# Patient Record
Sex: MALE | Race: WHITE | Employment: FULL TIME | URBAN - NONMETROPOLITAN AREA
[De-identification: names, ages, dates, MRNs, and addresses within clinical notes are randomized per-mention and may not be internally consistent; named-entity substitution may affect disease eponyms.]

---

## 2017-05-02 ENCOUNTER — OFFICE VISIT (OUTPATIENT)
Dept: PRIMARY CARE CLINIC | Age: 22
End: 2017-05-02
Payer: COMMERCIAL

## 2017-05-02 VITALS
HEART RATE: 79 BPM | TEMPERATURE: 97.4 F | OXYGEN SATURATION: 97 % | HEIGHT: 74 IN | RESPIRATION RATE: 16 BRPM | BODY MASS INDEX: 25.03 KG/M2 | WEIGHT: 195 LBS | DIASTOLIC BLOOD PRESSURE: 74 MMHG | SYSTOLIC BLOOD PRESSURE: 124 MMHG

## 2017-05-02 DIAGNOSIS — J02.9 SORE THROAT: Primary | ICD-10-CM

## 2017-05-02 LAB — S PYO AG THROAT QL: NORMAL

## 2017-05-02 PROCEDURE — 99213 OFFICE O/P EST LOW 20 MIN: CPT | Performed by: FAMILY MEDICINE

## 2017-05-02 PROCEDURE — 87880 STREP A ASSAY W/OPTIC: CPT | Performed by: FAMILY MEDICINE

## 2020-09-02 ENCOUNTER — HOSPITAL ENCOUNTER (EMERGENCY)
Age: 25
Discharge: HOME | End: 2020-09-02
Payer: COMMERCIAL

## 2020-09-02 VITALS
WEIGHT: 230 LBS | DIASTOLIC BLOOD PRESSURE: 78 MMHG | HEART RATE: 63 BPM | HEIGHT: 74 IN | SYSTOLIC BLOOD PRESSURE: 136 MMHG | TEMPERATURE: 97.2 F | RESPIRATION RATE: 16 BRPM | OXYGEN SATURATION: 97 % | BODY MASS INDEX: 29.52 KG/M2

## 2020-09-02 PROCEDURE — 99202 OFFICE O/P NEW SF 15 MIN: CPT | Performed by: NURSE PRACTITIONER

## 2020-09-02 PROCEDURE — 99202 OFFICE O/P NEW SF 15 MIN: CPT

## 2020-09-02 RX ORDER — PREDNISONE 10 MG/1
TABLET ORAL
Qty: 40 TABLET | Refills: 0 | Status: SHIPPED | OUTPATIENT
Start: 2020-09-02 | End: 2020-09-12

## 2020-09-02 RX ORDER — PERMETHRIN 50 MG/G
CREAM TOPICAL
Qty: 1 TUBE | Refills: 1 | Status: SHIPPED | OUTPATIENT
Start: 2020-09-02

## 2020-09-02 ASSESSMENT — ENCOUNTER SYMPTOMS
TROUBLE SWALLOWING: 0
COLOR CHANGE: 1
SHORTNESS OF BREATH: 0
EYE PAIN: 0
EYE DISCHARGE: 0
EYE ITCHING: 0
EYE REDNESS: 0

## 2020-09-02 NOTE — ED TRIAGE NOTES
Woke up with rash on lower legs mostly, itches, was in woods over the week end, no one else at home has rash

## 2020-09-02 NOTE — LETTER
[facility]  [address] [place] [address]  Phone: [phone]               September 2, 2020    Patient: Broderick Nova   YOB: 1995   Date of Visit: 9/2/2020       To Whom It May Concern:    Donavan Oakes was seen and treated in our emergency department on 9/2/2020. He may return to work on 9/5/2020.       Sincerely,       JACQUELINE Tate CNP         Signature:_Electronically signed by JACQUELINE Tate CNP on 9/2/2020 at 5:17 PM  _________________________________

## 2020-09-02 NOTE — ED PROVIDER NOTES
[facility]  Ur[facility] Encounter       CHIEF COMPLAINT       Chief Complaint   Patient presents with    Rash       Nurses Notes reviewed and I agree except as noted in the HPI. HISTORY OF PRESENT ILLNESS   Niecy Verdin is a 22 y.o. male who presents to the urgent care center complaining of a rash to the lower ankles bilaterally lower legs going upward to the thigh patient stated he also has a scattered rash to the torso. The patient complains of itching. Patient states that he was in the woods with his family over the weekend when he developed a rash on Sunday. Patient stated at that time he was wearing boots and long pants And no one else had the rash but him. The patient complains of itching at night. Patient states he works at [employer]  and is around a lot of heat at work and that he was intensifying the itch on his legs so he went to the [employer] doctor. The company doctor stated that he should be off work for 3 days and then when he comes back next week he will be on a long stretch. Patient states that he has been scratching that a lot at night but has not really been putting any creams on the rash or taken anything for itch. He denies any other family members or children that have a rash. The history is provided by the patient. No  was used.    Rash   Location:  Leg  Leg rash location:  R lower leg and L lower leg  Quality: itchiness and redness    Quality: not blistering and not weeping    Severity:  Moderate  Onset quality:  Sudden  Duration:  5 days  Timing:  Constant  Progression:  Spreading  Chronicity:  New  Context: exposure to similar rash and plant contact    Relieved by:  Nothing  Worsened by:  Continued exposure to allergens  Ineffective treatments:  None tried  Associated symptoms: no fever, no headaches and no shortness of breath    Associated symptoms comment:  None      REVIEW OF SYSTEMS     Review of Systems   Constitutional: Negative for chills and fever. HENT: Negative for trouble swallowing. Eyes: Negative for pain, discharge, redness and itching. Respiratory: Negative for shortness of breath. Skin: Positive for color change and rash. Allergic/Immunologic: Negative for environmental allergies. Neurological: Negative for headaches. PAST MEDICAL HISTORY   History reviewed. No pertinent past medical history. SURGICALHISTORY     Patient  has a past surgical history that includes hernia repair (N/A). CURRENT MEDICATIONS       Discharge Medication List as of 9/2/2020  5:17 PM          ALLERGIES     Patient is has No Known Allergies. Patients   There is no immunization history on file for this patient. FAMILY HISTORY     Patient's family history is not on file. SOCIAL HISTORY     Patient  reports that he has never smoked. He has never used smokeless tobacco. He reports current alcohol use. He reports that he does not use drugs. PHYSICAL EXAM     ED TRIAGE VITALS  BP: 136/78, Temp: 97.2 °F (36.2 °C), Pulse: 63, Resp: 16, SpO2: 97 %,Estimated body mass index is 29.53 kg/m² as calculated from the following:    Height as of this encounter: 6' 2\" (1.88 m). Weight as of this encounter: 230 lb (104.3 kg). ,No LMP for male patient. Physical Exam  Vitals signs and nursing note reviewed. Constitutional:       General: He is not in acute distress. Appearance: He is well-developed. He is not ill-appearing, toxic-appearing or diaphoretic. HENT:      Head: Normocephalic. Nose: Nose normal.   Neck:      Musculoskeletal: Normal range of motion. Cardiovascular:      Rate and Rhythm: Normal rate. Pulmonary:      Effort: Pulmonary effort is normal.   Skin:     General: Skin is warm and dry. Capillary Refill: Capillary refill takes less than 2 seconds. Coloration: Skin is not pale. Findings: Erythema and rash present. No abrasion, ecchymosis, laceration or petechiae. Rash is papular. Rash is not purpuric, pustular, urticarial or vesicular. Comments: Lower legs bilaterally patient has scattered papular rash noted bilateral ankles radiating up to the lower legs and thighs. Patient also has scattered papular rash noted across the lower torso. Patient does complain of itching   Neurological:      Mental Status: He is alert and oriented to person, place, and time. Psychiatric:         Mood and Affect: Mood normal.         Behavior: Behavior normal. Behavior is cooperative. DIAGNOSTIC RESULTS     Labs:No results found for this visit on 09/02/20. IMAGING:    No orders to display         EKG:      URGENT CARE COURSE:     Vitals:    09/02/20 1647   BP: 136/78   Pulse: 63   Resp: 16   Temp: 97.2 °F (36.2 °C)   TempSrc: Infrared   SpO2: 97%   Weight: 230 lb (104.3 kg)   Height: 6' 2\" (1.88 m)       Medications - No data to display         PROCEDURES:  None    FINAL IMPRESSION      1. Chigger bites          DISPOSITION/ PLAN     I did discuss with the patient that it did look like more of a chiggers type of bite. The patient will be given a work slip for 3 days as requested by the company doctor. He was advised to do the following  Take Medication as Directed  Benadryl for itch, Don't scratch  Monitor for any increase in size or spreading  Monitor for fever or chills  Keep drainage covered if any.   Follow up with your PCP or return as needed  Or go to the emergency Department      PATIENT REFERRED TO:  Jeremyалександр Mumtaz  [address] [place] [address], [address] / [place] [id number]      DISCHARGE MEDICATIONS:  Discharge Medication List as of 9/2/2020  5:17 PM      START taking these medications    Details   permethrin (ELIMITE) 5 % cream Use as directed, Disp-1 Tube,R-1, Print      predniSONE (DELTASONE) 10 MG tablet 40 mg's po x 4 days, then 30 mg's po x 4 days, 20 mg's x 4 days, the 10 mgs po x 4 days, Disp-40 tablet,R-0Print             Discharge Medication List as of 9/2/2020  5:17 PM Discharge Medication List as of 9/2/2020  5:17 PM          JACQUELINE Kelly CNP    (Please note that portions of this note were completed with a voice recognition program. Efforts were made to edit the dictations but occasionally words are mis-transcribed.)           JACQUELINE Kelly CNP  09/02/20 48 JACQUELINE Bello CNP  09/02/20 1743
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
## 2020-09-02 NOTE — LETTER
[facility]  [address] [place] [id number]  Phone: [phone]               September 2, 2020    Patient: Jeremías Felton   YOB: 1995   Date of Visit: 9/2/2020       To Whom It May Concern:    Kenna Sparks was seen and treated in our emergency department on 9/2/2020. He may return to work on 9/6/2020.       Sincerely,       JACQUELINE Linton CNP         Signature:___Electronically signed by JACQUELINE Linton CNP on 9/2/20 at 5:23 PM EDT    _______________________________

## 2020-10-26 ENCOUNTER — HOSPITAL ENCOUNTER (EMERGENCY)
Age: 25
Discharge: HOME | End: 2020-10-26
Payer: COMMERCIAL

## 2020-10-26 VITALS
BODY MASS INDEX: 30.16 KG/M2 | HEART RATE: 63 BPM | RESPIRATION RATE: 18 BRPM | TEMPERATURE: 98.2 F | SYSTOLIC BLOOD PRESSURE: 135 MMHG | WEIGHT: 235 LBS | OXYGEN SATURATION: 98 % | DIASTOLIC BLOOD PRESSURE: 79 MMHG | HEIGHT: 74 IN

## 2020-10-26 PROCEDURE — U0003 INFECTIOUS AGENT DETECTION BY NUCLEIC ACID (DNA OR RNA); SEVERE ACUTE RESPIRATORY SYNDROME CORONAVIRUS 2 (SARS-COV-2) (CORONAVIRUS DISEASE [COVID-19]), AMPLIFIED PROBE TECHNIQUE, MAKING USE OF HIGH THROUGHPUT TECHNOLOGIES AS DESCRIBED BY CMS-2020-01-R: HCPCS

## 2020-10-26 PROCEDURE — 99213 OFFICE O/P EST LOW 20 MIN: CPT

## 2020-10-26 PROCEDURE — 99213 OFFICE O/P EST LOW 20 MIN: CPT | Performed by: NURSE PRACTITIONER

## 2020-10-26 ASSESSMENT — ENCOUNTER SYMPTOMS
SORE THROAT: 0
COUGH: 0
ABDOMINAL PAIN: 0
VOMITING: 0
SINUS PRESSURE: 1
SINUS PAIN: 1
NAUSEA: 0
SHORTNESS OF BREATH: 0
CHEST TIGHTNESS: 0
DIARRHEA: 0
WHEEZING: 0

## 2020-10-26 ASSESSMENT — PAIN DESCRIPTION - PAIN TYPE: TYPE: ACUTE PAIN

## 2020-10-26 ASSESSMENT — PAIN DESCRIPTION - DESCRIPTORS: DESCRIPTORS: ACHING

## 2020-10-26 ASSESSMENT — PAIN SCALES - GENERAL: PAINLEVEL_OUTOF10: 6

## 2020-10-26 ASSESSMENT — PAIN DESCRIPTION - LOCATION: LOCATION: GENERALIZED

## 2020-10-26 NOTE — ED NOTES
Patient verbalized understanding of discharge instructions. Denies questions or concerns at this time .      Sherryle Holter, RN  10/26/20 1906

## 2020-10-26 NOTE — ED PROVIDER NOTES
[place]  Urgent Care Encounter       CHIEF COMPLAINT       Chief Complaint   Patient presents with    Generalized Body Aches    Sinusitis       Nurses Notes reviewed and I agree except as noted in the HPI. HISTORY OF PRESENT ILLNESS   Linh Ji is a 22 y.o. male who presents     Patient is in the urgent care today with complaints of generalized body aches, sinus congestion, and loss of smell and taste, has started within the last 24 to 48 hours. He states that he does work in a [employer] and there have been other coworkers who have tested positive, however he does not believe he has been directly exposed to any of them. Denies any known fevers. Denies taking any over-the-counter medications. REVIEW OF SYSTEMS     Review of Systems   Constitutional: Negative for chills, fatigue and fever. HENT: Positive for congestion, postnasal drip, sinus pressure and sinus pain. Negative for sore throat. Respiratory: Negative for cough, chest tightness, shortness of breath and wheezing. Cardiovascular: Negative for chest pain and palpitations. Gastrointestinal: Negative for abdominal pain, diarrhea, nausea and vomiting. Musculoskeletal: Positive for myalgias. Negative for arthralgias. Skin: Negative for rash. Neurological: Negative for dizziness, weakness, light-headedness, numbness and headaches. PAST MEDICAL HISTORY   History reviewed. No pertinent past medical history. SURGICALHISTORY     Patient  has a past surgical history that includes hernia repair (N/A). CURRENT MEDICATIONS       Discharge Medication List as of 10/26/2020  5:06 PM      CONTINUE these medications which have NOT CHANGED    Details   permethrin (ELIMITE) 5 % cream Use as directed, Disp-1 Tube,R-1, Print             ALLERGIES     Patient is has No Known Allergies. Patients   There is no immunization history on file for this patient.     FAMILY HISTORY     Patient's family history is not on file. SOCIAL HISTORY     Patient  reports that he has never smoked. He has never used smokeless tobacco. He reports current alcohol use. He reports that he does not use drugs. PHYSICAL EXAM     ED TRIAGE VITALS  BP: 135/79, Temp: 98.2 °F (36.8 °C), Pulse: 63, Resp: 18, SpO2: 98 %,Estimated body mass index is 30.17 kg/m² as calculated from the following:    Height as of this encounter: 6' 2\" (1.88 m). Weight as of this encounter: 235 lb (106.6 kg). ,No LMP for male patient. Physical Exam  Constitutional:       General: He is not in acute distress. Appearance: Normal appearance. He is not ill-appearing, toxic-appearing or diaphoretic. HENT:      Nose: Congestion present. Mouth/Throat:      Mouth: Mucous membranes are moist.      Pharynx: No oropharyngeal exudate or posterior oropharyngeal erythema. Neck:      Musculoskeletal: Normal range of motion and neck supple. No neck rigidity or muscular tenderness. Pulmonary:      Effort: Pulmonary effort is normal. No respiratory distress. Musculoskeletal: Normal range of motion. Lymphadenopathy:      Cervical: No cervical adenopathy. Skin:     General: Skin is warm. Findings: No rash. Neurological:      General: No focal deficit present. Mental Status: He is alert and oriented to person, place, and time. Sensory: No sensory deficit. Psychiatric:         Mood and Affect: Mood normal.         Behavior: Behavior normal.         Thought Content: Thought content normal.         Judgment: Judgment normal.         DIAGNOSTIC RESULTS     Labs:No results found for this visit on 10/26/20.     IMAGING:    No orders to display     URGENT CARE COURSE:     Vitals:    10/26/20 1641   BP: 135/79   Pulse: 63   Resp: 18   Temp: 98.2 °F (36.8 °C)   TempSrc: Temporal   SpO2: 98%   Weight: 235 lb (106.6 kg)   Height: 6' 2\" (1.88 m)       Medications - No data to display         PROCEDURES:  None    FINAL IMPRESSION      1. COVID-19 ruled

## 2020-10-26 NOTE — ED TRIAGE NOTES
Patient states he has had sinus pressure with headache since Friday. Patient also states he has been achy with loss of taste and smell in the last 2 days.

## 2020-10-27 ENCOUNTER — CARE COORDINATION (OUTPATIENT)
Dept: CARE COORDINATION | Age: 25
End: 2020-10-27

## 2020-10-27 NOTE — CARE COORDINATION
Left voicemail message to return call to [phone] for ED/ Covid outreach. This is the 2nd attempt to contact, no further attempts will be made.

## 2020-10-28 LAB — SARS-COV-2: DETECTED

## 2023-03-14 ENCOUNTER — HOSPITAL ENCOUNTER (EMERGENCY)
Age: 28
Discharge: HOME | End: 2023-03-14
Attending: EMERGENCY MEDICINE
Payer: OTHER MISCELLANEOUS

## 2023-03-14 ENCOUNTER — ANCILLARY PROCEDURE (OUTPATIENT)
Dept: EMERGENCY DEPT | Age: 28
End: 2023-03-14
Payer: OTHER MISCELLANEOUS

## 2023-03-14 ENCOUNTER — APPOINTMENT (OUTPATIENT)
Dept: CT IMAGING | Age: 28
End: 2023-03-14
Payer: OTHER MISCELLANEOUS

## 2023-03-14 VITALS
SYSTOLIC BLOOD PRESSURE: 132 MMHG | HEART RATE: 92 BPM | OXYGEN SATURATION: 97 % | DIASTOLIC BLOOD PRESSURE: 73 MMHG | RESPIRATION RATE: 20 BRPM | TEMPERATURE: 97.8 F
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
## 2023-03-14 LAB
ABO: NORMAL
ALBUMIN SERPL BCG-MCNC: 4.9 G/DL (ref 3.5–5.1)
ALP SERPL-CCNC: 80 U/L (ref 38–126)
ALT SERPL W/O P-5'-P-CCNC: 37 U/L (ref 11–66)
ANION GAP SERPL CALC-SCNC: 13 MEQ/L (ref 8–16)
ANTIBODY SCREEN: NORMAL
APTT PPP: 28.5 SECONDS (ref 22–38)
AST SERPL-CCNC: 28 U/L (ref 5–40)
BASOPHILS ABSOLUTE: 0.1 THOU/MM3 (ref 0–0.1)
BASOPHILS NFR BLD AUTO: 1 %
BILIRUB CONJ SERPL-MCNC: < 0.2 MG/DL (ref 0–0.3)
BILIRUB SERPL-MCNC: 0.2 MG/DL (ref 0.3–1.2)
BUN SERPL-MCNC: 16 MG/DL (ref 7–22)
CALCIUM SERPL-MCNC: 9.6 MG/DL (ref 8.5–10.5)
CHLORIDE SERPL-SCNC: 103 MEQ/L (ref 98–111)
CO2 SERPL-SCNC: 25 MEQ/L (ref 23–33)
CREAT SERPL-MCNC: 0.9 MG/DL (ref 0.4–1.2)
DEPRECATED RDW RBC AUTO: 40 FL (ref 35–45)
EOSINOPHIL NFR BLD AUTO: 2.4 %
EOSINOPHILS ABSOLUTE: 0.2 THOU/MM3 (ref 0–0.4)
ERYTHROCYTE [DISTWIDTH] IN BLOOD BY AUTOMATED COUNT: 12.2 % (ref 11.5–14.5)
ETHANOL SERPL-MCNC: < 0.01 %
GFR SERPL CREATININE-BSD FRML MDRD: > 60 ML/MIN/1.73M2
GLUCOSE SERPL-MCNC: 102 MG/DL (ref 70–108)
HCT VFR BLD AUTO: 50.1 % (ref 42–52)
HGB BLD-MCNC: 16.4 GM/DL (ref 14–18)
IMM GRANULOCYTES # BLD AUTO: 0.04 THOU/MM3 (ref 0–0.07)
IMM GRANULOCYTES NFR BLD AUTO: 0.5 %
INR PPP: 1 (ref 0.85–1.13)
LYMPHOCYTES ABSOLUTE: 2.5 THOU/MM3 (ref 1–4.8)
LYMPHOCYTES NFR BLD AUTO: 31.4 %
MCH RBC QN AUTO: 29.2 PG (ref 26–33)
MCHC RBC AUTO-ENTMCNC: 32.7 GM/DL (ref 32.2–35.5)
MCV RBC AUTO: 89.3 FL (ref 80–94)
MONOCYTES ABSOLUTE: 0.7 THOU/MM3 (ref 0.4–1.3)
MONOCYTES NFR BLD AUTO: 9.1 %
NEUTROPHILS NFR BLD AUTO: 55.6 %
NRBC BLD AUTO-RTO: 0 /100 WBC
OSMOLALITY SERPL CALC.SUM OF ELEC: 282.6 MOSMOL/KG (ref 275–300)
PLATELET # BLD AUTO: 285 THOU/MM3 (ref 130–400)
PMV BLD AUTO: 9.3 FL (ref 9.4–12.4)
POTASSIUM SERPL-SCNC: 3.7 MEQ/L (ref 3.5–5.2)
PROT SERPL-MCNC: 8.1 G/DL (ref 6.1–8)
RBC # BLD AUTO: 5.61 MILL/MM3 (ref 4.7–6.1)
RH FACTOR: NORMAL
SEGMENTED NEUTROPHILS ABSOLUTE COUNT: 4.4 THOU/MM3 (ref 1.8–7.7)
SODIUM SERPL-SCNC: 141 MEQ/L (ref 135–145)
WBC # BLD AUTO: 7.9 THOU/MM3 (ref 4.8–10.8)

## 2023-03-14 PROCEDURE — 3209999900 POC US FAST ABDOMEN LIMITED

## 2023-03-14 PROCEDURE — 80053 COMPREHEN METABOLIC PANEL: CPT

## 2023-03-14 PROCEDURE — 99285 EMERGENCY DEPT VISIT HI MDM: CPT

## 2023-03-14 PROCEDURE — 82248 BILIRUBIN DIRECT: CPT

## 2023-03-14 PROCEDURE — 86850 RBC ANTIBODY SCREEN: CPT

## 2023-03-14 PROCEDURE — 85025 COMPLETE CBC W/AUTO DIFF WBC: CPT

## 2023-03-14 PROCEDURE — 82077 ASSAY SPEC XCP UR&BREATH IA: CPT

## 2023-03-14 PROCEDURE — 70450 CT HEAD/BRAIN W/O DYE: CPT

## 2023-03-14 PROCEDURE — 36415 COLL VENOUS BLD VENIPUNCTURE: CPT

## 2023-03-14 PROCEDURE — 72125 CT NECK SPINE W/O DYE: CPT

## 2023-03-14 PROCEDURE — 86900 BLOOD TYPING SEROLOGIC ABO: CPT

## 2023-03-14 PROCEDURE — 6360000004 HC RX CONTRAST MEDICATION: Performed by: STUDENT IN AN ORGANIZED HEALTH CARE EDUCATION/TRAINING PROGRAM

## 2023-03-14 PROCEDURE — 6820000002 HC L2 INJURY CALL ACTIVATION: Performed by: SURGERY

## 2023-03-14 PROCEDURE — 76376 3D RENDER W/INTRP POSTPROCES: CPT

## 2023-03-14 PROCEDURE — 71260 CT THORAX DX C+: CPT

## 2023-03-14 PROCEDURE — 86901 BLOOD TYPING SEROLOGIC RH(D): CPT

## 2023-03-14 PROCEDURE — 74177 CT ABD & PELVIS W/CONTRAST: CPT

## 2023-03-14 PROCEDURE — 85730 THROMBOPLASTIN TIME PARTIAL: CPT

## 2023-03-14 PROCEDURE — 85610 PROTHROMBIN TIME: CPT

## 2023-03-14 RX ORDER — ACETAMINOPHEN 500 MG
1000 TABLET ORAL ONCE
Status: DISCONTINUED | OUTPATIENT
Start: 2023-03-14 | End: 2023-03-14 | Stop reason: HOSPADM

## 2023-03-14 RX ADMIN — IOPAMIDOL 80 ML: 755 INJECTION, SOLUTION INTRAVENOUS at 17:50

## 2023-03-14 ASSESSMENT — PAIN SCALES - GENERAL
PAINLEVEL_OUTOF10: 4

## 2023-03-14 ASSESSMENT — ENCOUNTER SYMPTOMS
EYE REDNESS: 0
CHOKING: 0
PHOTOPHOBIA: 0
NAUSEA: 0
COUGH: 0
CHEST TIGHTNESS: 0
EYE DISCHARGE: 0
SINUS PRESSURE: 0
EYE PAIN: 0
TROUBLE SWALLOWING: 0
BACK PAIN: 1
SHORTNESS OF BREATH: 0
WHEEZING: 0
BLOOD IN STOOL: 0
ABDOMINAL DISTENTION: 0
VOICE CHANGE: 0
CONSTIPATION: 0
VOMITING: 0
EYE ITCHING: 0
APNEA: 0
ABDOMINAL PAIN: 0
STRIDOR: 0
RHINORRHEA: 0
FACIAL SWELLING: 0
COLOR CHANGE: 0
SORE THROAT: 0
DIARRHEA: 0

## 2023-03-14 ASSESSMENT — PAIN DESCRIPTION - LOCATION
LOCATION: NECK
LOCATION: NECK

## 2023-03-14 ASSESSMENT — PAIN - FUNCTIONAL ASSESSMENT
PAIN_FUNCTIONAL_ASSESSMENT: 0-10
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
## 2023-03-14 NOTE — ED NOTES
Pt resting in bed, pt denies any needs, call light within reach, side rails x2.       Liana Tavarez, JEROME  03/14/23 2255
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
